# Patient Record
Sex: FEMALE | Race: WHITE | NOT HISPANIC OR LATINO | Employment: UNEMPLOYED | URBAN - METROPOLITAN AREA
[De-identification: names, ages, dates, MRNs, and addresses within clinical notes are randomized per-mention and may not be internally consistent; named-entity substitution may affect disease eponyms.]

---

## 2017-03-21 ENCOUNTER — HOSPITAL ENCOUNTER (OUTPATIENT)
Dept: RADIOLOGY | Facility: MEDICAL CENTER | Age: 62
End: 2017-03-21
Attending: FAMILY MEDICINE
Payer: COMMERCIAL

## 2017-03-21 DIAGNOSIS — M79.671 RIGHT FOOT PAIN: ICD-10-CM

## 2017-03-21 PROCEDURE — 73630 X-RAY EXAM OF FOOT: CPT | Mod: RT

## 2019-08-12 ENCOUNTER — HOSPITAL ENCOUNTER (OUTPATIENT)
Dept: RADIOLOGY | Facility: MEDICAL CENTER | Age: 64
End: 2019-08-12
Attending: NURSE PRACTITIONER
Payer: COMMERCIAL

## 2019-08-12 DIAGNOSIS — K62.5 RECTAL BLEEDING: ICD-10-CM

## 2019-08-12 DIAGNOSIS — K92.1 HEMATOCHEZIA: ICD-10-CM

## 2019-08-12 DIAGNOSIS — K92.0 HEMATEMESIS WITHOUT NAUSEA: ICD-10-CM

## 2019-08-12 PROCEDURE — 74177 CT ABD & PELVIS W/CONTRAST: CPT

## 2019-08-12 PROCEDURE — 700117 HCHG RX CONTRAST REV CODE 255: Performed by: NURSE PRACTITIONER

## 2019-08-12 RX ADMIN — IOHEXOL 100 ML: 350 INJECTION, SOLUTION INTRAVENOUS at 10:45

## 2019-12-03 ENCOUNTER — HOSPITAL ENCOUNTER (OUTPATIENT)
Dept: RADIOLOGY | Facility: MEDICAL CENTER | Age: 64
End: 2019-12-03
Attending: FAMILY MEDICINE
Payer: COMMERCIAL

## 2019-12-03 DIAGNOSIS — M79.661 PAIN OF RIGHT LOWER LEG: ICD-10-CM

## 2019-12-03 DIAGNOSIS — R06.00 DYSPNEA, UNSPECIFIED TYPE: ICD-10-CM

## 2019-12-03 PROCEDURE — 93971 EXTREMITY STUDY: CPT | Mod: RT

## 2019-12-08 ENCOUNTER — OFFICE VISIT (OUTPATIENT)
Dept: URGENT CARE | Facility: PHYSICIAN GROUP | Age: 64
End: 2019-12-08
Payer: COMMERCIAL

## 2019-12-08 ENCOUNTER — HOSPITAL ENCOUNTER (OUTPATIENT)
Dept: RADIOLOGY | Facility: MEDICAL CENTER | Age: 64
End: 2019-12-08
Attending: PHYSICIAN ASSISTANT
Payer: COMMERCIAL

## 2019-12-08 VITALS
WEIGHT: 174 LBS | SYSTOLIC BLOOD PRESSURE: 140 MMHG | RESPIRATION RATE: 16 BRPM | HEIGHT: 61 IN | BODY MASS INDEX: 32.85 KG/M2 | TEMPERATURE: 98.7 F | OXYGEN SATURATION: 96 % | HEART RATE: 64 BPM | DIASTOLIC BLOOD PRESSURE: 92 MMHG

## 2019-12-08 DIAGNOSIS — M25.561 ACUTE PAIN OF RIGHT KNEE: ICD-10-CM

## 2019-12-08 DIAGNOSIS — M71.21 POPLITEAL CYST, RIGHT: ICD-10-CM

## 2019-12-08 DIAGNOSIS — M79.89 RIGHT LEG SWELLING: ICD-10-CM

## 2019-12-08 PROCEDURE — 73564 X-RAY EXAM KNEE 4 OR MORE: CPT | Mod: RT

## 2019-12-08 PROCEDURE — 99214 OFFICE O/P EST MOD 30 MIN: CPT | Performed by: PHYSICIAN ASSISTANT

## 2019-12-08 RX ORDER — HYDROXYZINE PAMOATE 50 MG/1
50 CAPSULE ORAL 3 TIMES DAILY PRN
COMMUNITY

## 2019-12-08 RX ORDER — TOPIRAMATE 100 MG/1
100 TABLET, FILM COATED ORAL 2 TIMES DAILY
COMMUNITY

## 2019-12-08 RX ORDER — MAGNESIUM OXIDE 400 MG/1
400 TABLET ORAL DAILY
COMMUNITY

## 2019-12-08 RX ORDER — FLUOXETINE HYDROCHLORIDE 40 MG/1
40 CAPSULE ORAL DAILY
COMMUNITY

## 2019-12-08 RX ORDER — TRIAMTERENE AND HYDROCHLOROTHIAZIDE 37.5; 25 MG/1; MG/1
1 TABLET ORAL DAILY
COMMUNITY

## 2019-12-08 RX ORDER — POTASSIUM CHLORIDE 1.5 G/1.58G
1 POWDER, FOR SOLUTION ORAL 2 TIMES DAILY
COMMUNITY

## 2019-12-08 RX ORDER — PANTOPRAZOLE SODIUM 40 MG/1
1 TABLET, DELAYED RELEASE ORAL DAILY
COMMUNITY
Start: 2019-09-13

## 2019-12-08 RX ORDER — SIMVASTATIN 20 MG
20 TABLET ORAL NIGHTLY
COMMUNITY

## 2019-12-08 RX ORDER — PRAMIPEXOLE DIHYDROCHLORIDE 1 MG/1
TABLET ORAL
COMMUNITY
Start: 2019-12-04

## 2019-12-08 ASSESSMENT — ENCOUNTER SYMPTOMS
SHORTNESS OF BREATH: 0
VOMITING: 0
WHEEZING: 0
CHILLS: 0
JOINT SWELLING: 1
COUGH: 0
ARTHRALGIAS: 1
NAUSEA: 0
NUMBNESS: 0
FEVER: 0
MYALGIAS: 0
TINGLING: 0
PALPITATIONS: 0
LEG SWELLING: 1
WEAKNESS: 0
FOCAL WEAKNESS: 0

## 2019-12-08 NOTE — PROGRESS NOTES
Subjective:      Khushi Verma is a 64 y.o. female who presents with Leg Swelling (R leg, pain, red, warm to touch around knee, painful to walk on x1week)            Leg Swelling   This is a new problem. Episode onset: 1 week. The problem occurs constantly. The problem has been unchanged. Associated symptoms include arthralgias (right knee pain behind right knee) and joint swelling. Pertinent negatives include no chest pain, chills, coughing, fever, myalgias, nausea, numbness, rash, vomiting or weakness. Exacerbated by: bearing weight and movement  She has tried NSAIDs for the symptoms. The treatment provided no relief.     The patient was evaluated by her PCP 5 days ago. A venous US was performed and showed the followin/3/2019 3:45 PM     HISTORY/REASON FOR EXAM:  Right lower leg pain and edema.        TECHNIQUE/EXAM DESCRIPTION: Right lower extremity doppler venous ultrasound was performed. Using both color and pulse-wave Doppler imaging, multiple images were obtained from the common femoral vein origins distally through the popliteal trifurcation.     COMPARISON:  None.     FINDINGS:     REAL-TIME GRAY-SCALE IMAGING:  Real-time gray-scale imaging reveals no evidence of focal wall thickening.     COLOR AND DUPLEX DOPPLER IMAGING:  Graded compression was used to demonstrate patent lumens.  There is no evidence for luminal thrombus.  There is normal response to augmentation and respiratory variation.     There is a small popliteal cyst measuring 2.3 x 1.2 x 1.7 cm the posterior medial aspect of the right knee.     IMPRESSION:     1.  No evidence of Right  lower extremity deep venous thrombosis.     2.  There is an incidental small popliteal cyst.    She continues to have pain and is unsure what is causing it.  She denies any recent injury.  She has no history of DVT or PE.  She reports warmth over anterior knee. She has no recent surgery or immobilization.    History reviewed. No pertinent past medical  "history.    History reviewed. No pertinent surgical history.    History reviewed. No pertinent family history.    No Known Allergies    Medications, Allergies, and current problem list reviewed today in Epic    Review of Systems   Constitutional: Negative for chills, fever and malaise/fatigue.   Respiratory: Negative for cough, shortness of breath and wheezing.    Cardiovascular: Positive for leg swelling. Negative for chest pain and palpitations.   Gastrointestinal: Negative for nausea and vomiting.   Musculoskeletal: Positive for arthralgias (right knee pain behind right knee), joint pain (right knee pain and swelling- pain behind right knee) and joint swelling. Negative for myalgias.   Skin: Negative for rash.        No skin redness   Neurological: Negative for tingling, focal weakness, weakness and numbness.     All other systems reviewed and are negative.        Objective:     /92 (BP Location: Right arm, Patient Position: Sitting, BP Cuff Size: Adult)   Pulse 64   Temp 37.1 °C (98.7 °F) (Temporal)   Resp 16   Ht 1.549 m (5' 1\")   Wt 78.9 kg (174 lb)   SpO2 96%   BMI 32.88 kg/m²      Physical Exam  Constitutional:       General: She is not in acute distress.     Appearance: She is not ill-appearing.   HENT:      Head: Normocephalic and atraumatic.   Eyes:      Conjunctiva/sclera: Conjunctivae normal.   Cardiovascular:      Rate and Rhythm: Normal rate.   Pulmonary:      Effort: Pulmonary effort is normal. No respiratory distress.   Musculoskeletal: Normal range of motion.      Right knee: She exhibits swelling (mild diffuse knee edema). She exhibits normal range of motion, no ecchymosis, no deformity, no erythema, normal patellar mobility and no bony tenderness. Tenderness (moderate TTP in posterior aspect of right knee) found.      Comments: No pitting  peripheral edema.    Neurological:      Mental Status: She is alert.   Psychiatric:         Mood and Affect: Mood normal.         Behavior: " Behavior normal.         Thought Content: Thought content normal.         Judgment: Judgment normal.             Narrative & Impression        12/8/2019 11:20 AM     HISTORY/REASON FOR EXAM:  Posterior right knee pain for one week.        TECHNIQUE/EXAM DESCRIPTION AND NUMBER OF VIEWS:  4 views of the RIGHT knee.     COMPARISON: None     FINDINGS:        There is no significant joint effusion.     There is no evidence of displaced  fracture or dislocation.     There is no significant degenerative change.     IMPRESSION:     1.  Unremarkable right knee series.            Assessment/Plan:     1. Acute pain of right knee  DX-KNEE COMPLETE 4+ RIGHT    REFERRAL TO ORTHOPEDICS   2. Right leg swelling  REFERRAL TO ORTHOPEDICS   3. Popliteal cyst, right  REFERRAL TO ORTHOPEDICS       Venous US from 5 days ago was negative. No signs of cellulitis.  X-ray unremarkable. Suspect pain is due to popliteal cyst.   Will have patient follow-up with Ortho.   Advised RICE and OTC NSAIDS.     Differential diagnoses, Supportive care, and indications for immediate follow-up discussed with patient.   Instructed to return to clinic or nearest emergency department for any change in condition, further concerns, or worsening of symptoms.    The patient demonstrated a good understanding and agreed with the treatment plan.    Irene Casarez P.A.-C.

## 2021-03-03 DIAGNOSIS — Z23 NEED FOR VACCINATION: ICD-10-CM

## 2021-05-29 ENCOUNTER — RECORDS - HEALTHEAST (OUTPATIENT)
Dept: ADMINISTRATIVE | Facility: CLINIC | Age: 66
End: 2021-05-29

## 2021-05-30 ENCOUNTER — RECORDS - HEALTHEAST (OUTPATIENT)
Dept: ADMINISTRATIVE | Facility: CLINIC | Age: 66
End: 2021-05-30

## 2021-07-21 ENCOUNTER — RECORDS - HEALTHEAST (OUTPATIENT)
Dept: ADMINISTRATIVE | Facility: CLINIC | Age: 66
End: 2021-07-21